# Patient Record
Sex: FEMALE | Race: WHITE | NOT HISPANIC OR LATINO | Employment: FULL TIME | URBAN - METROPOLITAN AREA
[De-identification: names, ages, dates, MRNs, and addresses within clinical notes are randomized per-mention and may not be internally consistent; named-entity substitution may affect disease eponyms.]

---

## 2018-01-24 ENCOUNTER — APPOINTMENT (OUTPATIENT)
Dept: CT IMAGING | Facility: HOSPITAL | Age: 58
End: 2018-01-24

## 2018-01-24 ENCOUNTER — APPOINTMENT (OUTPATIENT)
Dept: ULTRASOUND IMAGING | Facility: HOSPITAL | Age: 58
End: 2018-01-24

## 2018-01-24 ENCOUNTER — HOSPITAL ENCOUNTER (OUTPATIENT)
Facility: HOSPITAL | Age: 58
Setting detail: OBSERVATION
Discharge: HOME | End: 2018-01-25
Attending: EMERGENCY MEDICINE | Admitting: INTERNAL MEDICINE

## 2018-01-24 ENCOUNTER — APPOINTMENT (OUTPATIENT)
Dept: GENERAL RADIOLOGY | Facility: HOSPITAL | Age: 58
End: 2018-01-24

## 2018-01-24 DIAGNOSIS — R07.9 CHEST PAIN, UNSPECIFIED TYPE: Primary | ICD-10-CM

## 2018-01-24 DIAGNOSIS — R55 SYNCOPE, UNSPECIFIED SYNCOPE TYPE: ICD-10-CM

## 2018-01-24 LAB
ALBUMIN SERPL-MCNC: 4.3 G/DL (ref 3.2–4.8)
ALBUMIN/GLOB SERPL: 1.7 G/DL (ref 1.5–2.5)
ALP SERPL-CCNC: 53 U/L (ref 25–100)
ALT SERPL W P-5'-P-CCNC: 46 U/L (ref 7–40)
ANION GAP SERPL CALCULATED.3IONS-SCNC: 7 MMOL/L (ref 3–11)
AST SERPL-CCNC: 44 U/L (ref 0–33)
BACTERIA UR QL AUTO: ABNORMAL /HPF
BASOPHILS # BLD AUTO: 0.02 10*3/MM3 (ref 0–0.2)
BASOPHILS NFR BLD AUTO: 0.3 % (ref 0–1)
BILIRUB SERPL-MCNC: 0.5 MG/DL (ref 0.3–1.2)
BILIRUB UR QL STRIP: NEGATIVE
BUN BLD-MCNC: 12 MG/DL (ref 9–23)
BUN/CREAT SERPL: 15 (ref 7–25)
CALCIUM SPEC-SCNC: 9.4 MG/DL (ref 8.7–10.4)
CHLORIDE SERPL-SCNC: 106 MMOL/L (ref 99–109)
CLARITY UR: CLEAR
CO2 SERPL-SCNC: 23 MMOL/L (ref 20–31)
COLOR UR: YELLOW
CREAT BLD-MCNC: 0.8 MG/DL (ref 0.6–1.3)
D-LACTATE SERPL-SCNC: 1.7 MMOL/L (ref 0.5–2)
DEPRECATED RDW RBC AUTO: 56.1 FL (ref 37–54)
EOSINOPHIL # BLD AUTO: 0.13 10*3/MM3 (ref 0–0.3)
EOSINOPHIL NFR BLD AUTO: 1.9 % (ref 0–3)
ERYTHROCYTE [DISTWIDTH] IN BLOOD BY AUTOMATED COUNT: 14.8 % (ref 11.3–14.5)
GFR SERPL CREATININE-BSD FRML MDRD: 74 ML/MIN/1.73
GLOBULIN UR ELPH-MCNC: 2.6 GM/DL
GLUCOSE BLD-MCNC: 176 MG/DL (ref 70–100)
GLUCOSE UR STRIP-MCNC: NEGATIVE MG/DL
HCT VFR BLD AUTO: 41.7 % (ref 34.5–44)
HGB BLD-MCNC: 14.2 G/DL (ref 11.5–15.5)
HGB UR QL STRIP.AUTO: NEGATIVE
HOLD SPECIMEN: NORMAL
HOLD SPECIMEN: NORMAL
HYALINE CASTS UR QL AUTO: ABNORMAL /LPF
IMM GRANULOCYTES # BLD: 0.02 10*3/MM3 (ref 0–0.03)
IMM GRANULOCYTES NFR BLD: 0.3 % (ref 0–0.6)
INR PPP: 0.97
KETONES UR QL STRIP: ABNORMAL
LEUKOCYTE ESTERASE UR QL STRIP.AUTO: ABNORMAL
LIPASE SERPL-CCNC: 50 U/L (ref 6–51)
LYMPHOCYTES # BLD AUTO: 2.87 10*3/MM3 (ref 0.6–4.8)
LYMPHOCYTES NFR BLD AUTO: 42.3 % (ref 24–44)
MCH RBC QN AUTO: 35.3 PG (ref 27–31)
MCHC RBC AUTO-ENTMCNC: 34.1 G/DL (ref 32–36)
MCV RBC AUTO: 103.7 FL (ref 80–99)
MONOCYTES # BLD AUTO: 0.42 10*3/MM3 (ref 0–1)
MONOCYTES NFR BLD AUTO: 6.2 % (ref 0–12)
NEUTROPHILS # BLD AUTO: 3.32 10*3/MM3 (ref 1.5–8.3)
NEUTROPHILS NFR BLD AUTO: 49 % (ref 41–71)
NITRITE UR QL STRIP: NEGATIVE
PH UR STRIP.AUTO: 7.5 [PH] (ref 5–8)
PLATELET # BLD AUTO: 203 10*3/MM3 (ref 150–450)
PMV BLD AUTO: 10.1 FL (ref 6–12)
POTASSIUM BLD-SCNC: 4.3 MMOL/L (ref 3.5–5.5)
PROT SERPL-MCNC: 6.9 G/DL (ref 5.7–8.2)
PROT UR QL STRIP: NEGATIVE
PROTHROMBIN TIME: 10.6 SECONDS (ref 9.6–11.5)
RBC # BLD AUTO: 4.02 10*6/MM3 (ref 3.89–5.14)
RBC # UR: ABNORMAL /HPF
REF LAB TEST METHOD: ABNORMAL
SODIUM BLD-SCNC: 136 MMOL/L (ref 132–146)
SP GR UR STRIP: 1.01 (ref 1–1.03)
SQUAMOUS #/AREA URNS HPF: ABNORMAL /HPF
TROPONIN I SERPL-MCNC: 0 NG/ML (ref 0–0.07)
TROPONIN I SERPL-MCNC: <0.006 NG/ML
UROBILINOGEN UR QL STRIP: ABNORMAL
WBC NRBC COR # BLD: 6.78 10*3/MM3 (ref 3.5–10.8)
WBC UR QL AUTO: ABNORMAL /HPF
WHOLE BLOOD HOLD SPECIMEN: NORMAL
WHOLE BLOOD HOLD SPECIMEN: NORMAL

## 2018-01-24 PROCEDURE — 96372 THER/PROPH/DIAG INJ SC/IM: CPT

## 2018-01-24 PROCEDURE — 25010000002 ENOXAPARIN PER 10 MG: Performed by: INTERNAL MEDICINE

## 2018-01-24 PROCEDURE — 83605 ASSAY OF LACTIC ACID: CPT | Performed by: EMERGENCY MEDICINE

## 2018-01-24 PROCEDURE — G0378 HOSPITAL OBSERVATION PER HR: HCPCS

## 2018-01-24 PROCEDURE — 84484 ASSAY OF TROPONIN QUANT: CPT

## 2018-01-24 PROCEDURE — 0 IOPAMIDOL 61 % SOLUTION: Performed by: EMERGENCY MEDICINE

## 2018-01-24 PROCEDURE — 85025 COMPLETE CBC W/AUTO DIFF WBC: CPT | Performed by: EMERGENCY MEDICINE

## 2018-01-24 PROCEDURE — 96360 HYDRATION IV INFUSION INIT: CPT

## 2018-01-24 PROCEDURE — 96361 HYDRATE IV INFUSION ADD-ON: CPT

## 2018-01-24 PROCEDURE — 84484 ASSAY OF TROPONIN QUANT: CPT | Performed by: EMERGENCY MEDICINE

## 2018-01-24 PROCEDURE — 74178 CT ABD&PLV WO CNTR FLWD CNTR: CPT

## 2018-01-24 PROCEDURE — 99244 OFF/OP CNSLTJ NEW/EST MOD 40: CPT | Performed by: INTERNAL MEDICINE

## 2018-01-24 PROCEDURE — 83690 ASSAY OF LIPASE: CPT | Performed by: EMERGENCY MEDICINE

## 2018-01-24 PROCEDURE — 87086 URINE CULTURE/COLONY COUNT: CPT | Performed by: EMERGENCY MEDICINE

## 2018-01-24 PROCEDURE — 99285 EMERGENCY DEPT VISIT HI MDM: CPT

## 2018-01-24 PROCEDURE — 71045 X-RAY EXAM CHEST 1 VIEW: CPT

## 2018-01-24 PROCEDURE — 80053 COMPREHEN METABOLIC PANEL: CPT | Performed by: EMERGENCY MEDICINE

## 2018-01-24 PROCEDURE — 0 DIATRIZOATE MEGLUMINE & SODIUM PER 1 ML: Performed by: EMERGENCY MEDICINE

## 2018-01-24 PROCEDURE — 85610 PROTHROMBIN TIME: CPT | Performed by: EMERGENCY MEDICINE

## 2018-01-24 PROCEDURE — 93005 ELECTROCARDIOGRAM TRACING: CPT | Performed by: EMERGENCY MEDICINE

## 2018-01-24 PROCEDURE — 81001 URINALYSIS AUTO W/SCOPE: CPT | Performed by: EMERGENCY MEDICINE

## 2018-01-24 PROCEDURE — 76705 ECHO EXAM OF ABDOMEN: CPT

## 2018-01-24 RX ORDER — SODIUM CHLORIDE 0.9 % (FLUSH) 0.9 %
10 SYRINGE (ML) INJECTION AS NEEDED
Status: DISCONTINUED | OUTPATIENT
Start: 2018-01-24 | End: 2018-01-25 | Stop reason: HOSPADM

## 2018-01-24 RX ORDER — SODIUM CHLORIDE 450 MG/100ML
100 INJECTION, SOLUTION INTRAVENOUS CONTINUOUS
Status: DISCONTINUED | OUTPATIENT
Start: 2018-01-24 | End: 2018-01-25 | Stop reason: HOSPADM

## 2018-01-24 RX ORDER — LEVOTHYROXINE SODIUM 112 UG/1
112 TABLET ORAL
Status: DISCONTINUED | OUTPATIENT
Start: 2018-01-25 | End: 2018-01-25 | Stop reason: HOSPADM

## 2018-01-24 RX ORDER — LEVOTHYROXINE SODIUM 112 UG/1
112 TABLET ORAL DAILY
COMMUNITY

## 2018-01-24 RX ORDER — MEPERIDINE HYDROCHLORIDE 25 MG/ML
25 INJECTION INTRAMUSCULAR; INTRAVENOUS; SUBCUTANEOUS EVERY 4 HOURS PRN
Status: DISCONTINUED | OUTPATIENT
Start: 2018-01-24 | End: 2018-01-25 | Stop reason: HOSPADM

## 2018-01-24 RX ORDER — LISINOPRIL 20 MG/1
20 TABLET ORAL DAILY
COMMUNITY
End: 2018-01-25 | Stop reason: HOSPADM

## 2018-01-24 RX ORDER — CHOLECALCIFEROL (VITAMIN D3) 125 MCG
2000 CAPSULE ORAL DAILY
COMMUNITY

## 2018-01-24 RX ORDER — LANSOPRAZOLE 30 MG/1
30 CAPSULE, DELAYED RELEASE ORAL DAILY
COMMUNITY

## 2018-01-24 RX ORDER — ONDANSETRON 2 MG/ML
4 INJECTION INTRAMUSCULAR; INTRAVENOUS EVERY 6 HOURS PRN
Status: DISCONTINUED | OUTPATIENT
Start: 2018-01-24 | End: 2018-01-25 | Stop reason: HOSPADM

## 2018-01-24 RX ORDER — SODIUM CHLORIDE 450 MG/100ML
75 INJECTION, SOLUTION INTRAVENOUS CONTINUOUS
Status: DISCONTINUED | OUTPATIENT
Start: 2018-01-24 | End: 2018-01-25 | Stop reason: HOSPADM

## 2018-01-24 RX ORDER — HYDROCHLOROTHIAZIDE 25 MG/1
25 TABLET ORAL DAILY
COMMUNITY
End: 2018-01-25 | Stop reason: HOSPADM

## 2018-01-24 RX ORDER — ASPIRIN 81 MG/1
324 TABLET, CHEWABLE ORAL ONCE
Status: COMPLETED | OUTPATIENT
Start: 2018-01-24 | End: 2018-01-24

## 2018-01-24 RX ADMIN — IOPAMIDOL 100 ML: 612 INJECTION, SOLUTION INTRAVENOUS at 12:44

## 2018-01-24 RX ADMIN — ASPIRIN 81 MG 324 MG: 81 TABLET ORAL at 08:08

## 2018-01-24 RX ADMIN — Medication 15 ML: at 11:15

## 2018-01-24 RX ADMIN — SODIUM CHLORIDE 75 ML/HR: 4.5 INJECTION, SOLUTION INTRAVENOUS at 20:48

## 2018-01-24 RX ADMIN — ENOXAPARIN SODIUM 40 MG: 40 INJECTION SUBCUTANEOUS at 20:48

## 2018-01-24 NOTE — CONSULTS
[facility]  Cardiovascular Consultation Note    Reason for consultation: #1 syncope #2 chest pain 3 dominant pain    History of Present Illness:  This is a 57-year-old white female with a history of hypertension, nondiabetic, nonsmoker, no history of hyperlipidemia presents today after an episode of syncope associated with chest pain shortness of breath and diaphoresis.  The patient recently went to the [facility] emergency room complaining of chest pain shortness of breath and the study she describes sounds like a CT angiogram which was negative.  Patient states for the last 2-4 weeks she's been having shortness of breath and chest discomfort.  She describes chest pain as tightness occurring at both rest and exertion and last just a few minutes.  The patient has never had syncope before but she does get lightheaded when she stands up.  The patient stated she felt normal when she went to bed last night felt normal this morning when she got to work started having tightness in her chest and significant abdominal cramping she also got clammy and his shortness of breath.  While in the bathroom she started getting lightheaded and when she went back towards her office she had an episode of syncope.  No loss of bladder or bowel function.  No documented seizure activity.  She has affected bed on and during these episodes rate went as low as in the 40s.  She has had some recent weight loss but she's been on diet and her bowel function is within normal    Cardiac risk factors: #1 hypertension #2 positive family history    Past medical and surgical history, social and family history reviewed in EMR.    REVIEW OF SYSTEMS:     Past Medical History:   Diagnosis Date   • Anemia    • Disease of thyroid gland    • Hypertension      Past Surgical History:   Procedure Laterality Date   •  SECTION     • TUBAL ABDOMINAL LIGATION       Social History     Social History   • Marital  status:      Spouse name: N/A   • Number of children: N/A   • Years of education: N/A     Occupational History   • Not on file.     Social History Main Topics   • Smoking status: Never Smoker   • Smokeless tobacco: Not on file   • Alcohol use No   • Drug use: No   • Sexual activity: Not on file     Other Topics Concern   • Not on file     Social History Narrative   • No narrative on file     History reviewed. No pertinent family history.    H&P ROS reviewed and pertinent CV ROS as noted in HPI.    Cardiac:  Patient's been having tightness in her chest now for the last 3-4 weeks it occurs randomly also with exertion last short period of time.  No palpitations, she did have syncope today and no other episodes of syncope but does get lightheaded upon standing  Respiratory:  New onset dyspnea with no wheezing no hemoptysis no prolonged travel and a negative CTA  Lower Extremities:  No claudication no edema/she takes her fluid pill and her antihypertensive  GI:  No bright blood per rectum no melena but she did have intense abdominal cramping today  Neuro:  No history of stroke TIA or neurologic event no radicular pain  Hematology:  No bleeding diathesis ecchymosis or petechiae no clotting disorder  The remainder of the review of systems been obtained and is otherwise negative      Physical Exam: General  pleasant overweight white female lying in bed not on nasal O2 normal blood pressure and heart rate and in no acute distress       HEENT: No JVP no bruits tongue is midline no Hertoghs sign of the eyebrows sclera have no icterus       Respiratory:  Equal bilateral symmetrical expansion clear to auscultation bilaterally       Cardiovascular: Regular rate and rhythm without murmur gallop or click and no peripheral edema and 2+ left DP pulse and 1+ to 2+ right DP pulse on the right       GI: Sparse bowel sounds are noted in the lower quadrants positive bowel sounds in the mid quadrant she is tender to palpation in the  right lower quadrant with some mild rebound       Lower Extremities: No peripheral lesions normal strength       Neuro: Cranial nerves II through XII are grossly normal she moves all 4 extremities straight leg raise strength is equal bilaterally 5 over 5       Skin:  Warm and dry with no edema to palpation       Psych: Pleasant affect and oriented ×3    Results Review: I personally looked his EKG which is normal with no ischemia her troponin is 0.00 lipase is normal.  I reviewed her abdominal ultrasound and to my visualization I don't see any obvious gallstones.  I looked at her chest x-ray with no significant process going on she has no cardiomegaly           Vital Sign Min/Max for last 24 hours  Temp  Min: 97.8 °F (36.6 °C)  Max: 97.8 °F (36.6 °C)   BP  Min: 94/70  Max: 113/68   Pulse  Min: 75  Max: 96   Resp  Min: 20  Max: 20   SpO2  Min: 99 %  Max: 100 %   Flow (L/min)  Min: 2  Max: 2    No intake or output data in the 24 hours ending 01/24/18 1022          Current Facility-Administered Medications:   •  sodium chloride 0.9 % flush 10 mL, 10 mL, Intravenous, PRN, Dominguez Pfeiffer, DO    Current Outpatient Prescriptions:   •  Levothyroxine Sodium (SYNTHROID PO), Take 112 mcg by mouth Daily., Disp: , Rfl:   •  LISINOPRIL PO, Take 10 mg by mouth Daily., Disp: , Rfl:   •  VITAMIN B1-B12 IM, Inject  into the shoulder, thigh, or buttocks Every 30 (Thirty) Days., Disp: , Rfl:     Lab Review:     Results from last 7 days  Lab Units 01/24/18  0726   WBC 10*3/mm3 6.78   HEMOGLOBIN g/dL 14.2   PLATELETS 10*3/mm3 203       Results from last 7 days  Lab Units 01/24/18  0726   SODIUM mmol/L 136   POTASSIUM mmol/L 4.3   CO2 mmol/L 23.0   BUN mg/dL 12   CREATININE mg/dL 0.80   GLUCOSE mg/dL 176*     Estimated Creatinine Clearance: 72.1 mL/min (by C-G formula based on Cr of 0.8).        .lipd  Lab Results   Component Value Date    AST 44 (H) 01/24/2018    ALT 46 (H) 01/24/2018       Radiology Reports:  Imaging Results (last 72 hours)      Procedure Component Value Units Date/Time    XR Chest 1 View [[id number]] Collected:  01/24/18 0941     Updated:  01/24/18 0941    Narrative:       EXAMINATION: XR CHEST 1 VW- 01/24/2018     INDICATION: Chest pain protocol      COMPARISON: NONE     FINDINGS:   1. Heart size is borderline. The heart is compensated.  2. There is volume reduction at both lung bases with crowding of normal  lung markings. There is mild linear and nodular densities at the bases  worse on the right than left, likely atelectatic. There is no evidence  of free fluid or dominant mass. Upper lung zones are clear.       Impression:       1. Volume reduction with crowding of normal lung markings at  the bases. Borderline heart size.  2. No active disease otherwise.     D:  01/24/2018  E:  01/24/2018          US Gallbladder [[id number]] Updated:  01/24/18 1008          Assessment/Plan: 1#1 syncopeAssociated with intense abdominal pain shortness of breath chest pain and diaphoresis.  Current EKG is normal and troponin ×1 was 0.00.  She does have some CAD risk factors but her intense abdominal pain associated with some mild right lower quadrant pain and minimal rebound makes me think that she had vasovagal syncope.  I recommend CT scan of the abdomen and pelvis with and without contrast to look for any intra-abdominal pathology and if that's negative we'll admit the patient for observation and perform a nuclear stress test  2 chest pain -she has a normal EKG and a normal troponin ×1.  She does have a couple of CAD risk factors but her chest pain syndrome is atypical  3 mild orthostatic hypotension-I will give her IV fluids      Thiago Rivero MD  01/24/18  10:22 AM

## 2018-01-24 NOTE — ED PROVIDER NOTES
Subjective   HPI Comments: Patrizia Koo is a 57 y.o.female who presents to the ED with c/o syncope, chest pain, and abdominal pain. She reports she was at work this morning at 0645 when she suddenly developed generalized weakness, lightheadedness, nausea, diaphoresis, SOA, and diffuse body tingling. Soon thereafter, she developed sharp substernal chest pain, generalized abdominal pain, and a mild headache. She went to the bathroom with little relief and her symptoms appeared to worsen. She then proceeded to pass out. The episode was witnessed by coworkers. The patient describes a 10 minute lapse in time where she is unable to recall what happened. Following that event, she sat down in her office and drank some juice with minimal relief. Upon EMS arrival, she was breathing fast and appeared slightly panicked. EMS started her on supplemental oxygen via nasal cannula and she was transported to the ED. She states her pain has been intermittent since, lasting a few minutes at a time. She rates the pain an 8/10 at its worst. Her pain remains unchanged when taking deep breaths. She also c/o chronic back pain but denies recent trauma or any other complaints at this time. She has not had a stress test or cardiac catheterization done in the past. She has a history of hypertension but no history of high cholesterol, diabetes mellitus, or smoking. Her mother had cardiac stents placed in her 60's.       Patient is a 57 y.o. female presenting with chest pain.   History provided by:  Patient  Chest Pain   Pain location:  Substernal area  Pain quality: sharp    Pain radiates to:  Does not radiate  Pain severity:  Moderate  Onset quality:  Sudden  Timing:  Intermittent  Progression:  Unchanged  Chronicity:  New  Relieved by:  Nothing  Ineffective treatments:  Oxygen (Sitting down, drinking juice)  Associated symptoms: abdominal pain, back pain (chronic ), diaphoresis, headache, nausea, numbness (diffuse body tingling ), shortness of  breath, syncope and weakness (generalized )    Risk factors: hypertension    Risk factors: no coronary artery disease, no diabetes mellitus, no high cholesterol and no smoking        Review of Systems   Constitutional: Positive for diaphoresis.   Respiratory: Positive for shortness of breath.    Cardiovascular: Positive for chest pain and syncope.   Gastrointestinal: Positive for abdominal pain and nausea.   Musculoskeletal: Positive for back pain (chronic ).   Neurological: Positive for syncope, weakness (generalized ), light-headedness, numbness (diffuse body tingling ) and headaches.   Psychiatric/Behavioral:        Appeared slightly panicked.    All other systems reviewed and are negative.      Past Medical History:   Diagnosis Date   • Anemia    • Disease of thyroid gland    • Hypertension        Allergies   Allergen Reactions   • Codeine Itching   • Penicillins Swelling       Past Surgical History:   Procedure Laterality Date   •  SECTION     • TUBAL ABDOMINAL LIGATION         History reviewed. No pertinent family history.    Social History     Social History   • Marital status:      Spouse name: N/A   • Number of children: N/A   • Years of education: N/A     Social History Main Topics   • Smoking status: Never Smoker   • Smokeless tobacco: None   • Alcohol use No   • Drug use: No   • Sexual activity: Not Asked     Other Topics Concern   • None     Social History Narrative   • None         Objective   Physical Exam   Constitutional: She is oriented to person, place, and time. She appears well-developed and well-nourished. No distress.   HENT:   Head: Normocephalic and atraumatic.   Nose: Nose normal.   Eyes: Conjunctivae are normal. No scleral icterus.   Neck: Normal range of motion. Neck supple.   Cardiovascular: Normal rate, regular rhythm and normal heart sounds.    No murmur heard.  Pulmonary/Chest: Effort normal and breath sounds normal. No respiratory distress. She exhibits tenderness  (Reproducible anterior chest wall tenderness to palpation ).   Abdominal: Soft. Bowel sounds are normal. There is tenderness (Generalized abdominal tenderness ).   Musculoskeletal: Normal range of motion. She exhibits tenderness. She exhibits no edema.   Lower thoracic tenderness localized in the midline which is chronic according to the patient.     Neurological: She is alert and oriented to person, place, and time.   Skin: Skin is warm and dry.   Psychiatric: She has a normal mood and affect. Her behavior is normal.   Nursing note and vitals reviewed.      Procedures         ED Course  ED Course     Recent Results (from the past 24 hour(s))   Light Blue Top    Collection Time: 01/24/18  7:26 AM   Result Value Ref Range    Extra Tube hold for add-on    Green Top (Gel)    Collection Time: 01/24/18  7:26 AM   Result Value Ref Range    Extra Tube Hold for add-ons.    Lavender Top    Collection Time: 01/24/18  7:26 AM   Result Value Ref Range    Extra Tube hold for add-on    Gold Top - SST    Collection Time: 01/24/18  7:26 AM   Result Value Ref Range    Extra Tube Hold for add-ons.    Comprehensive Metabolic Panel    Collection Time: 01/24/18  7:26 AM   Result Value Ref Range    Glucose 176 (H) 70 - 100 mg/dL    BUN 12 9 - 23 mg/dL    Creatinine 0.80 0.60 - 1.30 mg/dL    Sodium 136 132 - 146 mmol/L    Potassium 4.3 3.5 - 5.5 mmol/L    Chloride 106 99 - 109 mmol/L    CO2 23.0 20.0 - 31.0 mmol/L    Calcium 9.4 8.7 - 10.4 mg/dL    Total Protein 6.9 5.7 - 8.2 g/dL    Albumin 4.30 3.20 - 4.80 g/dL    ALT (SGPT) 46 (H) 7 - 40 U/L    AST (SGOT) 44 (H) 0 - 33 U/L    Alkaline Phosphatase 53 25 - 100 U/L    Total Bilirubin 0.5 0.3 - 1.2 mg/dL    eGFR Non African Amer 74 >60 mL/min/1.73    Globulin 2.6 gm/dL    A/G Ratio 1.7 1.5 - 2.5 g/dL    BUN/Creatinine Ratio 15.0 7.0 - 25.0    Anion Gap 7.0 3.0 - 11.0 mmol/L   Protime-INR    Collection Time: 01/24/18  7:26 AM   Result Value Ref Range    Protime 10.6 9.6 - 11.5 Seconds     INR 0.97    Lipase    Collection Time: 01/24/18  7:26 AM   Result Value Ref Range    Lipase 50 6 - 51 U/L   CBC Auto Differential    Collection Time: 01/24/18  7:26 AM   Result Value Ref Range    WBC 6.78 3.50 - 10.80 10*3/mm3    RBC 4.02 3.89 - 5.14 10*6/mm3    Hemoglobin 14.2 11.5 - 15.5 g/dL    Hematocrit 41.7 34.5 - 44.0 %    .7 (H) 80.0 - 99.0 fL    MCH 35.3 (H) 27.0 - 31.0 pg    MCHC 34.1 32.0 - 36.0 g/dL    RDW 14.8 (H) 11.3 - 14.5 %    RDW-SD 56.1 (H) 37.0 - 54.0 fl    MPV 10.1 6.0 - 12.0 fL    Platelets 203 150 - 450 10*3/mm3    Neutrophil % 49.0 41.0 - 71.0 %    Lymphocyte % 42.3 24.0 - 44.0 %    Monocyte % 6.2 0.0 - 12.0 %    Eosinophil % 1.9 0.0 - 3.0 %    Basophil % 0.3 0.0 - 1.0 %    Immature Grans % 0.3 0.0 - 0.6 %    Neutrophils, Absolute 3.32 1.50 - 8.30 10*3/mm3    Lymphocytes, Absolute 2.87 0.60 - 4.80 10*3/mm3    Monocytes, Absolute 0.42 0.00 - 1.00 10*3/mm3    Eosinophils, Absolute 0.13 0.00 - 0.30 10*3/mm3    Basophils, Absolute 0.02 0.00 - 0.20 10*3/mm3    Immature Grans, Absolute 0.02 0.00 - 0.03 10*3/mm3   POC Troponin, Rapid    Collection Time: 01/24/18  8:02 AM   Result Value Ref Range    Troponin I 0.00 0.00 - 0.07 ng/mL   Urinalysis With / Culture If Indicated - Urine, Clean Catch    Collection Time: 01/24/18  8:08 AM   Result Value Ref Range    Color, UA Yellow Yellow, Straw    Appearance, UA Clear Clear    pH, UA 7.5 5.0 - 8.0    Specific Gravity, UA 1.012 1.001 - 1.030    Glucose, UA Negative Negative    Ketones, UA Trace (A) Negative    Bilirubin, UA Negative Negative    Blood, UA Negative Negative    Protein, UA Negative Negative    Leuk Esterase, UA Moderate (2+) (A) Negative    Nitrite, UA Negative Negative    Urobilinogen, UA 0.2 E.U./dL 0.2 - 1.0 E.U./dL   Urinalysis, Microscopic Only - Urine, Clean Catch    Collection Time: 01/24/18  8:08 AM   Result Value Ref Range    RBC, UA 0-2 None Seen, 0-2 /HPF    WBC, UA 3-5 (A) None Seen /HPF    Bacteria, UA None Seen None Seen,  Trace /HPF    Squamous Epithelial Cells, UA 3-6 (A) None Seen, 0-2 /HPF    Hyaline Casts, UA 0-6 0 - 6 /LPF    Methodology Manual Light Microscopy    Lactic Acid, Plasma    Collection Time: 01/24/18  8:34 AM   Result Value Ref Range    Lactate 1.7 0.5 - 2.0 mmol/L   Troponin    Collection Time: 01/24/18 10:47 AM   Result Value Ref Range    Troponin I <0.006 <=0.039 ng/mL     Note: In addition to lab results from this visit, the labs listed above may include labs taken at another facility or during a different encounter within the last 24 hours. Please correlate lab times with ED admission and discharge times for further clarification of the services performed during this visit.    US Gallbladder   Final Result   Negative upper abdominal, right upper quadrant ultrasound.   No acute focal abnormalities are noted as described.       D:  01/24/2018   E:  01/24/2018            This report was finalized on 1/24/2018 12:06 PM by Dr. Luis Alberto Nelson MD.          XR Chest 1 View   Final Result   1. Volume reduction with crowding of normal lung markings at   the bases. Borderline heart size.   2. No active disease otherwise.       D:  01/24/2018   E:  01/24/2018       This report was finalized on 1/24/2018 11:03 AM by Dr. Luis Alberto Nelson MD.          CT Abdomen Pelvis With & Without Contrast    (Results Pending)     Vitals:    01/24/18 0830 01/24/18 0929 01/24/18 1100 01/24/18 1215   BP:  110/68     Patient Position:       Pulse: 95 91 88 84   Resp:       Temp:       TempSrc:       SpO2: 99% 100% 100% 99%   Weight:       Height:         Medications   sodium chloride 0.9 % flush 10 mL (not administered)   sodium chloride 0.45 % infusion (not administered)   enoxaparin (LOVENOX) syringe 40 mg (not administered)   sodium chloride 0.45 % infusion (not administered)   ondansetron (ZOFRAN) injection 4 mg (not administered)   meperidine (DEMEROL) injection 25 mg (not administered)   levothyroxine (SYNTHROID, LEVOTHROID) tablet 112  mcg (not administered)   aspirin chewable tablet 324 mg (324 mg Oral Given 1/24/18 0808)   diatrizoate meglumine-sodium (GASTROGRAFIN) 66-10 % solution 15 mL (15 mL Oral Given 1/24/18 1115)   iopamidol (ISOVUE-300) 61 % injection 100 mL (100 mL Intravenous Given 1/24/18 1244)     ECG/EMG Results (last 24 hours)     Procedure Component Value Units Date/Time    ECG 12 Lead [[id number]] Collected:  01/24/18 0724     Updated:  01/24/18 0738    ECG 12 Lead [[id number]] Collected:  01/24/18 1052     Updated:  01/24/18 1102        Cardiology evaluated pt and admitted following negative CT scan of the abdomen and pelvis.                MDM    Final diagnoses:   Chest pain, unspecified type   Syncope, unspecified syncope type       Documentation assistance provided by shalini Evans.  Information recorded by the scribe was done at my direction and has been verified and validated by me.     Tavia Evans  01/24/18 0851       Tavia Evans  01/24/18 1448       Tavia Evans  01/24/18 1512       Dominguez Pfeiffer DO  01/24/18 1533

## 2018-01-24 NOTE — PROGRESS NOTES
Discharge Planning Assessment  [facility]     Patient Name: Patrizia Koo  MRN: [id number]  Today's Date: 1/24/2018    Admit Date: 1/24/2018          Discharge Needs Assessment       01/24/18 1735    Living Environment    Lives With spouse   Pt resides in [place] [facility] [facility] with her  Varun    Living Arrangements house    Type of Financial/Environmental Concern none    Transportation Available car;family or friend will provide    Living Environment    Provides Primary Care For no one    Quality Of Family Relationships supportive;helpful;involved    Able to Return to Prior Living Arrangements yes    Discharge Needs Assessment    Concerns To Be Addressed no discharge needs identified;denies needs/concerns at this time    Readmission Within The Last 30 Days no previous admission in last 30 days    Anticipated Changes Related to Illness none    Equipment Currently Used at Home none    Equipment Needed After Discharge none    Discharge Contact Information if Applicable [phone]            Discharge Plan       01/24/18 1736    Case Management/Social Work Plan    Plan home    Patient/Family In Agreement With Plan yes    Additional Comments CM spoke with pt and family at bedside. Pt plans to return home and denies dsicharge needs at this time. CM will continue to follow.         Discharge Placement     No information found                Demographic Summary       01/24/18 1730    Referral Information    Referral Source admission list    Contact Information    Permission Granted to Share Information With     Primary Care Physician Information    Name JERI Bee            Functional Status       01/24/18 1730    Functional Status Current    Current Functional Level Comment unable ot assess    Functional Status Prior    Ambulation 0-->independent    Transferring 0-->independent    Toileting 0-->independent    Bathing 0-->independent    Dressing 0-->independent    Eating 0-->independent     Communication 0-->understands/communicates without difficulty    Swallowing 0-->swallows foods/liquids without difficulty    IADL    Medications independent   pt confirm she has prescription drug coverage and denies issues obtaining or affording current medications    Meal Preparation independent    Housekeeping independent    Laundry independent    Shopping independent    Oral Care independent    Activity Tolerance    Current Activity Limitations none    Usual Activity Tolerance good    Current Activity Tolerance moderate    Employment/Financial    Financial Concerns none   Pt confirems she has [employer] and [employer]. She does not have [employer] card with her however  to obtain tonight and submit to registration tomorrow for accurate billing.            Psychosocial     None            Abuse/Neglect     None            Legal     None            Substance Abuse     None            Patient Forms     None          Cheyanne Matias

## 2018-01-25 ENCOUNTER — APPOINTMENT (OUTPATIENT)
Dept: CARDIOLOGY | Facility: HOSPITAL | Age: 58
End: 2018-01-25
Attending: INTERNAL MEDICINE

## 2018-01-25 VITALS
HEART RATE: 89 BPM | WEIGHT: 174.2 LBS | TEMPERATURE: 98 F | DIASTOLIC BLOOD PRESSURE: 78 MMHG | RESPIRATION RATE: 20 BRPM | OXYGEN SATURATION: 98 % | BODY MASS INDEX: 35.12 KG/M2 | SYSTOLIC BLOOD PRESSURE: 118 MMHG | HEIGHT: 59 IN

## 2018-01-25 LAB
ANION GAP SERPL CALCULATED.3IONS-SCNC: 6 MMOL/L (ref 3–11)
BH CV NUCLEAR PRIOR STUDY: 2
BH CV STRESS BP STAGE 1: NORMAL
BH CV STRESS BP STAGE 2: NORMAL
BH CV STRESS BP STAGE 4: NORMAL
BH CV STRESS COMMENTS STAGE 1: NORMAL
BH CV STRESS DOSE REGADENOSON STAGE 1: 0.4
BH CV STRESS DURATION MIN STAGE 1: 0
BH CV STRESS DURATION MIN STAGE 2: 1
BH CV STRESS DURATION MIN STAGE 3: 1
BH CV STRESS DURATION MIN STAGE 4: 1
BH CV STRESS DURATION SEC STAGE 1: 15
BH CV STRESS DURATION SEC STAGE 2: 0
BH CV STRESS HR STAGE 1: 106
BH CV STRESS HR STAGE 2: 110
BH CV STRESS HR STAGE 3: 109
BH CV STRESS HR STAGE 4: 102
BH CV STRESS O2 STAGE 1: 64
BH CV STRESS PROTOCOL 1: NORMAL
BH CV STRESS RECOVERY BP: NORMAL MMHG
BH CV STRESS RECOVERY HR: 97 BPM
BH CV STRESS STAGE 1: 1
BH CV STRESS STAGE 2: 2
BH CV STRESS STAGE 3: 3
BH CV STRESS STAGE 4: 4
BUN BLD-MCNC: 8 MG/DL (ref 9–23)
BUN/CREAT SERPL: 10 (ref 7–25)
CALCIUM SPEC-SCNC: 9.2 MG/DL (ref 8.7–10.4)
CHLORIDE SERPL-SCNC: 103 MMOL/L (ref 99–109)
CO2 SERPL-SCNC: 27 MMOL/L (ref 20–31)
CREAT BLD-MCNC: 0.8 MG/DL (ref 0.6–1.3)
GFR SERPL CREATININE-BSD FRML MDRD: 74 ML/MIN/1.73
GLUCOSE BLD-MCNC: 92 MG/DL (ref 70–100)
LV EF NUC BP: 84 %
MAXIMAL PREDICTED HEART RATE: 163 BPM
PERCENT MAX PREDICTED HR: 69.94 %
POTASSIUM BLD-SCNC: 4.2 MMOL/L (ref 3.5–5.5)
SODIUM BLD-SCNC: 136 MMOL/L (ref 132–146)
STRESS BASELINE BP: NORMAL MMHG
STRESS BASELINE HR: 88 BPM
STRESS PERCENT HR: 82 %
STRESS POST PEAK BP: NORMAL MMHG
STRESS POST PEAK HR: 114 BPM
STRESS TARGET HR: 139 BPM

## 2018-01-25 PROCEDURE — A9555 RB82 RUBIDIUM: HCPCS | Performed by: INTERNAL MEDICINE

## 2018-01-25 PROCEDURE — G0378 HOSPITAL OBSERVATION PER HR: HCPCS

## 2018-01-25 PROCEDURE — 93017 CV STRESS TEST TRACING ONLY: CPT

## 2018-01-25 PROCEDURE — 99217 PR OBSERVATION CARE DISCHARGE MANAGEMENT: CPT | Performed by: INTERNAL MEDICINE

## 2018-01-25 PROCEDURE — 78491 MYOCRD IMG PET 1STD RST/STRS: CPT | Performed by: INTERNAL MEDICINE

## 2018-01-25 PROCEDURE — 96361 HYDRATE IV INFUSION ADD-ON: CPT

## 2018-01-25 PROCEDURE — 93018 CV STRESS TEST I&R ONLY: CPT | Performed by: INTERNAL MEDICINE

## 2018-01-25 PROCEDURE — 0 RUBIDIUM CHLORIDE: Performed by: INTERNAL MEDICINE

## 2018-01-25 PROCEDURE — 25010000002 REGADENOSON 0.4 MG/5ML SOLUTION: Performed by: INTERNAL MEDICINE

## 2018-01-25 PROCEDURE — 78491 MYOCRD IMG PET 1STD RST/STRS: CPT

## 2018-01-25 PROCEDURE — 80048 BASIC METABOLIC PNL TOTAL CA: CPT | Performed by: INTERNAL MEDICINE

## 2018-01-25 RX ORDER — ACETAMINOPHEN 325 MG/1
650 TABLET ORAL EVERY 6 HOURS PRN
Status: DISCONTINUED | OUTPATIENT
Start: 2018-01-25 | End: 2018-01-25 | Stop reason: HOSPADM

## 2018-01-25 RX ADMIN — ACETAMINOPHEN 650 MG: 325 TABLET, FILM COATED ORAL at 08:57

## 2018-01-25 RX ADMIN — RUBIDIUM CHLORIDE RB-82 1 DOSE: 150 INJECTION, SOLUTION INTRAVENOUS at 09:25

## 2018-01-25 RX ADMIN — RUBIDIUM CHLORIDE RB-82 1 DOSE: 150 INJECTION, SOLUTION INTRAVENOUS at 09:35

## 2018-01-25 RX ADMIN — LEVOTHYROXINE SODIUM 112 MCG: 112 TABLET ORAL at 06:35

## 2018-01-25 RX ADMIN — REGADENOSON 0.4 MG: 0.08 INJECTION, SOLUTION INTRAVENOUS at 09:39

## 2018-01-25 NOTE — PROGRESS NOTES
[facility] at [facility]  IP Progress Note      Chief Complaint/Reason for service:  Syncope #2 chest pain shortness of breath    Subjective   Subjective: The patient complaining of a headache this morning    Past medical, surgical, social and family history reviewed in the patient's electronic medical record.    Objective     Vital Sign Min/Max for last 24 hours  Temp  Min: 98.2 °F (36.8 °C)  Max: 98.6 °F (37 °C)   BP  Min: 98/68  Max: 115/81   Pulse  Min: 84  Max: 116   Resp  Min: 20  Max: 20   SpO2  Min: 93 %  Max: 100 %   Flow (L/min)  Min: 2  Max: 2      Intake/Output Summary (Last 24 hours) at 01/25/18 0811  Last data filed at 01/25/18 0433   Gross per 24 hour   Intake              240 ml   Output              400 ml   Net             -160 ml             Current Facility-Administered Medications:   •  enoxaparin (LOVENOX) syringe 40 mg, 40 mg, Subcutaneous, Nightly, Thiago Rivero MD, 40 mg at 01/24/18 2048  •  levothyroxine (SYNTHROID, LEVOTHROID) tablet 112 mcg, 112 mcg, Oral, Q AM, Thiago Rivero MD, 112 mcg at 01/25/18 0635  •  meperidine (DEMEROL) injection 25 mg, 25 mg, Intravenous, Q4H PRN, Thiago Rivero MD  •  ondansetron (ZOFRAN) injection 4 mg, 4 mg, Intravenous, Q6H PRN, Thiago Rivero MD  •  sodium chloride 0.45 % infusion, 100 mL/hr, Intravenous, Continuous, Thiago Rivero MD  •  sodium chloride 0.45 % infusion, 75 mL/hr, Intravenous, Continuous, Thiago Rivero MD, Last Rate: 75 mL/hr at 01/25/18 0727, 75 mL/hr at 01/25/18 0727  •  sodium chloride 0.9 % flush 10 mL, 10 mL, Intravenous, PRN, Dominguez Pfeiffer DO    Physical Exam: General  Well-developed overweight white female in no acute distress        HEENT:        Respiratory:         Cardiovascular:        GI:        Lower Extremities:        Neuro:        Skin:         Psych:     Results Review: 2 troponins are normal EKG is normal.  CT of the abdomen and pelvis yesterday showed no acute  process    Radiology Results:  Imaging Results (last 72 hours)     Procedure Component Value Units Date/Time    XR Chest 1 View [[id number]] Collected:  01/24/18 0941     Updated:  01/24/18 1105    Narrative:       EXAMINATION: XR CHEST 1 VW- 01/24/2018     INDICATION: Chest pain protocol      COMPARISON: NONE     FINDINGS:   1. Heart size is borderline. The heart is compensated.  2. There is volume reduction at both lung bases with crowding of normal  lung markings. There is mild linear and nodular densities at the bases  worse on the right than left, likely atelectatic. There is no evidence  of free fluid or dominant mass. Upper lung zones are clear.       Impression:       1. Volume reduction with crowding of normal lung markings at  the bases. Borderline heart size.  2. No active disease otherwise.     D:  01/24/2018  E:  01/24/2018     This report was finalized on 1/24/2018 11:03 AM by Dr. Luis Alberto Nelson MD.       US Gallbladder [[id number]] Collected:  01/24/18 1204     Updated:  01/24/18 1208    Narrative:       EXAMINATION: US GALLBLADDER- 01/24/2018     INDICATION: Upper abdominal pain         COMPARISON: NONE     FINDINGS:   1. Pancreatic images are normal. There is no evidence of pancreatic mass  or cyst.  2. Echo architecture of the liver is normal. Focal liver mass is not  identified. There is no free fluid around the liver.  3. Gallbladder examination is negative. Mobile shadowing stones are not  identified. Gallbladder wall thickness is normal. Common bile duct is  normal and measures 1.8 mm.  4. The right kidney measures 11.3 x 3.9 x 4.8 cm. There is 1.1 cm of  cortical thickness. There is a mild extrarenal pelvis. There is no right  renal mass or high-grade obstruction.       Impression:       Negative upper abdominal, right upper quadrant ultrasound.  No acute focal abnormalities are noted as described.     D:  01/24/2018  E:  01/24/2018         This report was finalized on 1/24/2018 12:06 PM by   Luis Alberto Nelson MD.       CT Abdomen Pelvis With & Without Contrast [[id number]] Collected:  01/24/18 1541     Updated:  01/24/18 1602    Narrative:       EXAMINATION: CT ABDOMEN PELVIS W/WO CONTRAST - 01/24/2018     INDICATION: Abdominal pain, right upper quadrant pain and nausea.     TECHNIQUE: CT datasets of the abdomen and pelvis were performed without  and with intravenous and oral contrast.     The radiation dose reduction device was turned on for each scan per the  ALARA (As Low as Reasonably Achievable) protocol.     COMPARISON: No pertinent comparison datasets are available.     FINDINGS:   1. Gallbladder is negative for abnormal radiodensities. Renal or  ureteral stones or not identified and there are no bladder stones on the  precontrast evaluation. Also, no definite signs of choledocholithiasis  are noted.     2. The multiphase postcontrast datasets demonstrate a normal-appearing  liver and spleen with scattered calcified granulomas but no evidence of  solid mass. There is trace hiatus hernia in the lower chest. The kidneys  are negative for stone, mass or obstruction. Retrocrural space is clear.  The adrenals are normal. Pancreas is negative for mass, cyst or  stranding. Periaortic space and retroperitoneum are clear. There is no  inflammatory bowel disease and there is no right upper quadrant acute  abnormality.     3. There is no evidence of stranding or edema in the mesentery or  omentum. The lower abdomen and pelvis are clear. Uterus and adnexal  structures are normal. The lower lung zones are nonrevealing.       Impression:       Negative CT data set of the abdomen and pelvis for acute  focal abnormality.     DICTATED:     01/24/2018  EDITED    :     01/24/2018         This report was finalized on 1/24/2018 4:00 PM by Dr. Luis Alberto Nelson MD.             EKG: Sinus rhythm no ischemia    ECHO:     Tele:  Occasional episode of sinus tachycardia but there is no bradycardia no pauses and no  SVT    Assessment   Assessment/Plan: 1 this patient presented with an episode of syncope which was preceded with severe abdominal cramping so it suggests vasovagal syncope.  She also little orthostatic yesterday in the ER and she's been hydrated aggressively.  Her blood pressure was a little orthostatic yesterday and she was hydrated aggressively  2 chest pain and shortness of breath-patient is scheduled for a stress pet today  3 history of hypertension but the patient's blood pressure is been in the 90s to low 100s.  I did not resume her lisinopril yesterday.  Upon discharge she may leave without it and make her declare a blood pressure reading high enough that she needs to be on antihypertensive drugs    Thiago Rivero MD  01/25/18  8:11 AM

## 2018-01-25 NOTE — PLAN OF CARE
Problem: Patient Care Overview (Adult)  Goal: Plan of Care Review  Outcome: Ongoing (interventions implemented as appropriate)   01/25/18 0516   Coping/Psychosocial Response Interventions   Plan Of Care Reviewed With patient   Patient Care Overview   Progress no change   Outcome Evaluation   Outcome Summary/Follow up Plan VSS, has had no c/o chest or abdominal pain since arriving to the floor. Has been NPO since 0000 for stress test today,. Will continue to monitor.       Problem: Pain, Acute (Adult)  Goal: Identify Related Risk Factors and Signs and Symptoms  Outcome: Ongoing (interventions implemented as appropriate)    Goal: Acceptable Pain Control/Comfort Level  Outcome: Ongoing (interventions implemented as appropriate)

## 2018-01-25 NOTE — DISCHARGE SUMMARY
Cardiology Discharge Note    Patient Name:  Patrizia Koo  Date of Admission:  1/24/2018  Date of Discharge:  1/25/2018    Discharge Diagnosis: syncope and atypical chest pain    Problem List:  Active Problems:    Chest pain      Presenting Problem/History of Present Illness:  Chest pain [R07.9]    58 yo wf who started have severe abdominal pain and became soa and sweaty. Also had chest pain. All 3 troponin negative and ekg normal. Had normal stress pet but calcification of lad noted. Her CT and GB u/s were negative.    Hospital Course:  Patient is a 57 y.o. female presented with see above.        Physical Exam:  Temp:  [98.2 °F (36.8 °C)-98.6 °F (37 °C)] 98.2 °F (36.8 °C)  Heart Rate:  [] 85  Resp:  [20] 20  BP: ()/(68-81) 98/68    Neck:  No jvp/no bruit    Cardiovascular: RRR without m/g/c        Respiratory:  clear    Neurologic:  CN 2-12 wnl      Procedures Performed: CT scan  2 GB u/s  3 stress pet      Consults:   Consults     Date and Time Order Name Status Description    1/24/2018 0943 Cardiology (on-call MD unless specified) Completed             Condition on Discharge:  godd      Discharge Disposition: home    Discharge Medications:   Patrizia Koo   Home Medication Instructions RACHELE:[id number]    Printed on:01/25/18 1138   Medication Information                      aspirin 81 MG tablet  Take 1 tablet by mouth Daily.             Cholecalciferol (VITAMIN D3) 2000 units tablet  Take 2,000 Units by mouth Daily.             lansoprazole (PREVACID) 30 MG capsule  Take 30 mg by mouth Daily.             levothyroxine (SYNTHROID, LEVOTHROID) 112 MCG tablet  Take 112 mcg by mouth Daily.             naltrexone-bupropion ER (CONTRAVE) 8-90 MG tablet  Take 2 tablets by mouth 2 (Two) Times a Day.                 Discharge Diet: healthy heart    Activity at Discharge: limit activity for 1 week. Avoid strenuous activity in the cold.Return to ER for recurrent chest pain  /soa/syncope    Follow-up  Appointments: [facility] 4 weeks      Time:20 minutes    Thiago Rivero MD,  1/25/2018 - 11:38 AM

## 2018-01-26 LAB
BACTERIA SPEC AEROBE CULT: NORMAL
BACTERIA SPEC AEROBE CULT: NORMAL

## 2024-08-06 ENCOUNTER — HOSPITAL ENCOUNTER (EMERGENCY)
Facility: HOSPITAL | Age: 64
Discharge: HOME | End: 2024-08-06
Attending: EMERGENCY MEDICINE | Admitting: EMERGENCY MEDICINE
Payer: COMMERCIAL

## 2024-08-06 VITALS
WEIGHT: 155 LBS | RESPIRATION RATE: 20 BRPM | SYSTOLIC BLOOD PRESSURE: 176 MMHG | BODY MASS INDEX: 30.43 KG/M2 | OXYGEN SATURATION: 95 % | HEART RATE: 95 BPM | TEMPERATURE: 98.1 F | DIASTOLIC BLOOD PRESSURE: 95 MMHG | HEIGHT: 60 IN

## 2024-08-06 DIAGNOSIS — S51.812A LACERATION OF LEFT FOREARM, INITIAL ENCOUNTER: Primary | ICD-10-CM

## 2024-08-06 DIAGNOSIS — I10 ELEVATED BLOOD PRESSURE READING WITH DIAGNOSIS OF HYPERTENSION: ICD-10-CM

## 2024-08-06 DIAGNOSIS — S50.12XA HEMATOMA OF LEFT FOREARM: ICD-10-CM

## 2024-08-06 DIAGNOSIS — F41.9 ANXIETY: ICD-10-CM

## 2024-08-06 DIAGNOSIS — R20.0 NUMBNESS AND TINGLING IN LEFT HAND: ICD-10-CM

## 2024-08-06 DIAGNOSIS — R20.2 NUMBNESS AND TINGLING IN LEFT HAND: ICD-10-CM

## 2024-08-06 DIAGNOSIS — Z86.39 HISTORY OF HYPOTHYROIDISM: ICD-10-CM

## 2024-08-06 DIAGNOSIS — R25.1 TREMULOUSNESS: ICD-10-CM

## 2024-08-06 LAB
ALBUMIN SERPL-MCNC: 3.9 G/DL (ref 3.5–5.2)
ALBUMIN/GLOB SERPL: 1.5 G/DL
ALP SERPL-CCNC: 53 U/L (ref 39–117)
ALT SERPL W P-5'-P-CCNC: 20 U/L (ref 1–33)
ANION GAP SERPL CALCULATED.3IONS-SCNC: 16 MMOL/L (ref 5–15)
AST SERPL-CCNC: 36 U/L (ref 1–32)
BASOPHILS # BLD AUTO: 0.02 10*3/MM3 (ref 0–0.2)
BASOPHILS NFR BLD AUTO: 0.3 % (ref 0–1.5)
BILIRUB SERPL-MCNC: 0.5 MG/DL (ref 0–1.2)
BUN SERPL-MCNC: 8 MG/DL (ref 8–23)
BUN/CREAT SERPL: 10 (ref 7–25)
CALCIUM SPEC-SCNC: 8.6 MG/DL (ref 8.6–10.5)
CHLORIDE SERPL-SCNC: 104 MMOL/L (ref 98–107)
CO2 SERPL-SCNC: 20 MMOL/L (ref 22–29)
CREAT SERPL-MCNC: 0.8 MG/DL (ref 0.57–1)
DEPRECATED RDW RBC AUTO: 45.1 FL (ref 37–54)
EGFRCR SERPLBLD CKD-EPI 2021: 82.9 ML/MIN/1.73
EOSINOPHIL # BLD AUTO: 0.06 10*3/MM3 (ref 0–0.4)
EOSINOPHIL NFR BLD AUTO: 0.9 % (ref 0.3–6.2)
ERYTHROCYTE [DISTWIDTH] IN BLOOD BY AUTOMATED COUNT: 13.3 % (ref 12.3–15.4)
GLOBULIN UR ELPH-MCNC: 2.6 GM/DL
GLUCOSE BLDC GLUCOMTR-MCNC: 96 MG/DL (ref 70–130)
GLUCOSE SERPL-MCNC: 125 MG/DL (ref 65–99)
HCT VFR BLD AUTO: 45.3 % (ref 34–46.6)
HGB BLD-MCNC: 15.3 G/DL (ref 12–15.9)
IMM GRANULOCYTES # BLD AUTO: 0.02 10*3/MM3 (ref 0–0.05)
IMM GRANULOCYTES NFR BLD AUTO: 0.3 % (ref 0–0.5)
LYMPHOCYTES # BLD AUTO: 1.66 10*3/MM3 (ref 0.7–3.1)
LYMPHOCYTES NFR BLD AUTO: 25.7 % (ref 19.6–45.3)
MCH RBC QN AUTO: 31.1 PG (ref 26.6–33)
MCHC RBC AUTO-ENTMCNC: 33.8 G/DL (ref 31.5–35.7)
MCV RBC AUTO: 92.1 FL (ref 79–97)
MONOCYTES # BLD AUTO: 0.51 10*3/MM3 (ref 0.1–0.9)
MONOCYTES NFR BLD AUTO: 7.9 % (ref 5–12)
NEUTROPHILS NFR BLD AUTO: 4.19 10*3/MM3 (ref 1.7–7)
NEUTROPHILS NFR BLD AUTO: 64.9 % (ref 42.7–76)
NRBC BLD AUTO-RTO: 0 /100 WBC (ref 0–0.2)
PLATELET # BLD AUTO: 188 10*3/MM3 (ref 140–450)
PMV BLD AUTO: 10.1 FL (ref 6–12)
POTASSIUM SERPL-SCNC: 3.9 MMOL/L (ref 3.5–5.2)
PROT SERPL-MCNC: 6.5 G/DL (ref 6–8.5)
RBC # BLD AUTO: 4.92 10*6/MM3 (ref 3.77–5.28)
SODIUM SERPL-SCNC: 140 MMOL/L (ref 136–145)
WBC NRBC COR # BLD AUTO: 6.46 10*3/MM3 (ref 3.4–10.8)

## 2024-08-06 PROCEDURE — 80053 COMPREHEN METABOLIC PANEL: CPT | Performed by: PHYSICIAN ASSISTANT

## 2024-08-06 PROCEDURE — 99283 EMERGENCY DEPT VISIT LOW MDM: CPT

## 2024-08-06 PROCEDURE — 93005 ELECTROCARDIOGRAM TRACING: CPT | Performed by: PHYSICIAN ASSISTANT

## 2024-08-06 PROCEDURE — 82948 REAGENT STRIP/BLOOD GLUCOSE: CPT

## 2024-08-06 PROCEDURE — 36415 COLL VENOUS BLD VENIPUNCTURE: CPT

## 2024-08-06 PROCEDURE — 85025 COMPLETE CBC W/AUTO DIFF WBC: CPT | Performed by: PHYSICIAN ASSISTANT

## 2024-08-06 RX ORDER — LORAZEPAM 0.5 MG/1
0.5 TABLET ORAL ONCE
Status: COMPLETED | OUTPATIENT
Start: 2024-08-06 | End: 2024-08-06

## 2024-08-06 RX ADMIN — LORAZEPAM 0.5 MG: 0.5 TABLET ORAL at 01:36

## 2024-08-06 NOTE — DISCHARGE INSTRUCTIONS
ER workup was reassuring.  Blood pressure was elevated with history of hypertension, but patient is anxious and appears in pain.  Patient has stable, superficial laceration with underlying hematoma to the left forearm.  There is no active bleeding.  We applied Dermabond and wound dressing.  Tetanus status is up-to-date.  There is no concern for tendon injury.  Patient has good strength and there is no evidence for active bleeding.  EKG and CBC and chemistries were within normal limits.  Recommend close PCP follow-up for recheck within 24 to 48 hours due to elevated blood pressure for recheck.  Return to the ER if worsening symptoms.

## 2024-08-06 NOTE — ED PROVIDER NOTES
"Subjective   History of Present Illness  This is a 63-year-old female that presents the ER with laceration to the flexor aspect of left forearm that occurred around 1600 this afternoon.  Patient says that she was cutting corn off of the cob, and she was cutting towards herself and accidentally cut her left forearm, extensor aspect.  She said that laceration appeared superficial and there was no significant bleeding.  She cleaned the wound and applied a Band-Aid.  She is right-handed.  Tetanus status is up-to-date.  She thought everything was fine, and she rolled over in bed and then noticed that the wound was bleeding.  She also reported a small localized hematoma.  Patient reports increased pain around the area that is radiating up towards the left upper arm.  She also reports some numbness and tingling to all the fingers on her left hand.  There is no active bleeding upon arrival.  She denies any decreased strength to the left wrist, hand, or the rest of her left upper extremity.  Patient does not utilize any daily aspirin or other chronic anticoagulation.  Past medical history is significant for hypertension, anemia, and hypothyroidism.  No other concerns at this time.  Patient also feels shaky and anxious and blood pressure is elevated.  She denies any chest pain or shortness of breath.  Has been request that we check her blood sugar due to patient being \"shaky\"    History provided by:  Patient and spouse  Laceration  Location:  Shoulder/arm  Shoulder/arm laceration location:  L forearm  Depth:  Through dermis  Time since incident:  8 hours  Laceration mechanism:  Knife  Pain details:     Quality:  Throbbing and aching    Severity:  Mild    Timing:  Constant    Progression:  Unchanged  Foreign body present:  No foreign bodies  Relieved by:  Nothing  Worsened by:  Nothing  Ineffective treatments: Patient cleaned the wound at home with soap and water and place a Band-Aid earlier in the afternoon.  Tetanus status:  " "Up to date  Associated symptoms: swelling (Localized hematoma to flexor aspect of left forearm)    Associated symptoms: no fever, no focal weakness, no numbness (Patient reports some numbness and tingling to fingers on the left hand), no redness and no streaking        Review of Systems   Constitutional: Negative.  Negative for activity change, appetite change, chills, diaphoresis, fatigue and fever.   Respiratory: Negative.  Negative for shortness of breath.    Cardiovascular: Negative.  Negative for chest pain, palpitations and leg swelling.   Gastrointestinal: Negative.  Negative for abdominal pain, constipation, diarrhea, nausea and vomiting.   Musculoskeletal:  Positive for arthralgias (No bony tenderness to left forearm or wrist). Negative for gait problem and joint swelling.   Skin:  Positive for color change (Small hematoma surrounding superficial laceration to the left forearm, no active bleeding) and wound (Superficial laceration to flexor aspect of left forearm).   Neurological:  Positive for numbness (Numbness and tingling to fingers on the left hand). Negative for dizziness, focal weakness, syncope, speech difficulty and headaches.        Patient feels \"shaky \"and anxious upon arrival   Psychiatric/Behavioral:  The patient is nervous/anxious.    All other systems reviewed and are negative.      Past Medical History:   Diagnosis Date    Anemia     Disease of thyroid gland     Hypertension        Allergies   Allergen Reactions    Codeine Itching    Penicillins Swelling       Past Surgical History:   Procedure Laterality Date     SECTION      TUBAL ABDOMINAL LIGATION         No family history on file.    Social History     Socioeconomic History    Marital status:    Tobacco Use    Smoking status: Never   Substance and Sexual Activity    Alcohol use: No    Drug use: No           Objective   Physical Exam  Vitals and nursing note reviewed.   Constitutional:       General: She is not in acute " distress.     Appearance: Normal appearance. She is not ill-appearing, toxic-appearing or diaphoretic.      Comments: Anxious on exam.  No acute distress   HENT:      Head: Normocephalic and atraumatic.      Nose: Nose normal.      Mouth/Throat:      Mouth: Mucous membranes are moist.   Eyes:      Extraocular Movements: Extraocular movements intact.      Conjunctiva/sclera: Conjunctivae normal.      Pupils: Pupils are equal, round, and reactive to light.   Cardiovascular:      Rate and Rhythm: Normal rate and regular rhythm. No extrasystoles are present.     Pulses: Normal pulses.           Radial pulses are 2+ on the left side.      Heart sounds: Normal heart sounds. No murmur heard.     Comments: Regular rate and rhythm.  No ectopy.  No pedal edema to lower extremities.  Strong left radial and ulnar pulses and brisk capillary refill  Pulmonary:      Effort: Pulmonary effort is normal.      Breath sounds: Normal breath sounds.      Comments: Lungs are clear to auscultation bilaterally  Abdominal:      General: Bowel sounds are normal.      Palpations: Abdomen is soft.   Musculoskeletal:         General: Normal range of motion.      Cervical back: Normal range of motion and neck supple.      Right lower leg: No edema.      Left lower leg: No edema.      Comments: No bony tenderness to left wrist or left forearm.  Patient has good strength to the left hand and wrist at 5 out of 5.  No concern for tendon injury.  See skin exam for details on wound to the left flexor aspect of left forearm.   Skin:     General: Skin is warm and dry.      Findings: Bruising and laceration present.             Comments: Patient has 1.5 cm superficial, non-gaping laceration to flexor aspect of left forearm.  There is underlying localized hematoma without any active bleeding.  No erythematous streaking or soft tissue swelling to left forearm.   Neurological:      General: No focal deficit present.      Mental Status: She is alert and  oriented to person, place, and time.      Cranial Nerves: Cranial nerves 2-12 are intact.      Sensory: Sensation is intact.      Motor: Motor function is intact.      Coordination: Coordination is intact.      Comments: Neuro intact and nonfocal.  Good sensation to left hand, and the rest of left upper extremity.   Psychiatric:         Mood and Affect: Affect normal. Mood is anxious.         Speech: Speech normal.         Behavior: Behavior normal. Behavior is cooperative.         Thought Content: Thought content normal.         Cognition and Memory: Cognition and memory normal.         Judgment: Judgment normal.      Comments: Anxious and trembling on exam.         Procedures           ED Course  ED Course as of 08/06/24 0215   Tue Aug 06, 2024   0159 Blood pressure is elevated upon arrival at 193/109.  Patient is anxious and tremulous.  Has been requested that we check her sugar and Accu-Chek was 96.  We gave patient a soda with crackers.  Patient personally evaluated by Dr. Parker, ER attending physician.  There is no need for closure of wound to the flexor aspect of left forearm.  The wound is non-gaping and there is a small underlying hematoma.  Nursing staff cleaned the wound thoroughly and applied Dermabond and nonstick dressing with Coban.  There is no concern for tendon injury.  The patient has good strength 5 out of 5 and full range of motion of the left hand, wrist, and forearm.  Patient's tetanus status is up-to-date.  We performed EKG which showed sinus rhythm without evidence of ectopy, arrhythmia, or ischemia.  I also ordered a dose of Ativan 0.5 mg by mouth and we will do some labs including CBC and chemistries. [FC]   0211 Chemistries were within normal limits.  Patient ready for discharge to home. [FC]      ED Course User Index  [FC] Xi Grider PA-C                                 Recent Results (from the past 24 hour(s))   POC Glucose Once    Collection Time: 08/06/24  1:08 AM     Specimen: Blood   Result Value Ref Range    Glucose 96 70 - 130 mg/dL   ECG 12 Lead Other; weakness    Collection Time: 08/06/24  1:22 AM   Result Value Ref Range    QT Interval 336 ms    QTC Interval 415 ms   Comprehensive Metabolic Panel    Collection Time: 08/06/24  1:37 AM    Specimen: Blood   Result Value Ref Range    Glucose 125 (H) 65 - 99 mg/dL    BUN 8 8 - 23 mg/dL    Creatinine 0.80 0.57 - 1.00 mg/dL    Sodium 140 136 - 145 mmol/L    Potassium 3.9 3.5 - 5.2 mmol/L    Chloride 104 98 - 107 mmol/L    CO2 20.0 (L) 22.0 - 29.0 mmol/L    Calcium 8.6 8.6 - 10.5 mg/dL    Total Protein 6.5 6.0 - 8.5 g/dL    Albumin 3.9 3.5 - 5.2 g/dL    ALT (SGPT) 20 1 - 33 U/L    AST (SGOT) 36 (H) 1 - 32 U/L    Alkaline Phosphatase 53 39 - 117 U/L    Total Bilirubin 0.5 0.0 - 1.2 mg/dL    Globulin 2.6 gm/dL    A/G Ratio 1.5 g/dL    BUN/Creatinine Ratio 10.0 7.0 - 25.0    Anion Gap 16.0 (H) 5.0 - 15.0 mmol/L    eGFR 82.9 >60.0 mL/min/1.73   CBC Auto Differential    Collection Time: 08/06/24  1:37 AM    Specimen: Blood   Result Value Ref Range    WBC 6.46 3.40 - 10.80 10*3/mm3    RBC 4.92 3.77 - 5.28 10*6/mm3    Hemoglobin 15.3 12.0 - 15.9 g/dL    Hematocrit 45.3 34.0 - 46.6 %    MCV 92.1 79.0 - 97.0 fL    MCH 31.1 26.6 - 33.0 pg    MCHC 33.8 31.5 - 35.7 g/dL    RDW 13.3 12.3 - 15.4 %    RDW-SD 45.1 37.0 - 54.0 fl    MPV 10.1 6.0 - 12.0 fL    Platelets 188 140 - 450 10*3/mm3    Neutrophil % 64.9 42.7 - 76.0 %    Lymphocyte % 25.7 19.6 - 45.3 %    Monocyte % 7.9 5.0 - 12.0 %    Eosinophil % 0.9 0.3 - 6.2 %    Basophil % 0.3 0.0 - 1.5 %    Immature Grans % 0.3 0.0 - 0.5 %    Neutrophils, Absolute 4.19 1.70 - 7.00 10*3/mm3    Lymphocytes, Absolute 1.66 0.70 - 3.10 10*3/mm3    Monocytes, Absolute 0.51 0.10 - 0.90 10*3/mm3    Eosinophils, Absolute 0.06 0.00 - 0.40 10*3/mm3    Basophils, Absolute 0.02 0.00 - 0.20 10*3/mm3    Immature Grans, Absolute 0.02 0.00 - 0.05 10*3/mm3    nRBC 0.0 0.0 - 0.2 /100 WBC     Note: In addition to lab  results from this visit, the labs listed above may include labs taken at another facility or during a different encounter within the last 24 hours. Please correlate lab times with ED admission and discharge times for further clarification of the services performed during this visit.    No orders to display     Vitals:    08/06/24 0045 08/06/24 0046 08/06/24 0100 08/06/24 0130   BP: 169/93  (!) 177/141 (!) 166/103   BP Location:       Patient Position:       Pulse:  95 101 93   Resp:       Temp:       TempSrc:       SpO2:  97% 98% 98%   Weight:       Height:         Medications   LORazepam (ATIVAN) tablet 0.5 mg (0.5 mg Oral Given 8/6/24 0136)     ECG/EMG Results (last 24 hours)       Procedure Component Value Units Date/Time    ECG 12 Lead Other; weakness [[id number]] Collected: 08/06/24 0122     Updated: 08/06/24 0124     QT Interval 336 ms      QTC Interval 415 ms     Narrative:      Test Reason : WEAKNESS  Blood Pressure :   */*   mmHG  Vent. Rate :  92 BPM     Atrial Rate :  92 BPM     P-R Int : 162 ms          QRS Dur :  62 ms      QT Int : 336 ms       P-R-T Axes :  77 -17  14 degrees     QTc Int : 415 ms    Sinus rhythm with fusion complexes  Nonspecific ST and T wave abnormality  Abnormal ECG  When compared with ECG of 24-JAN-2018 10:52,  fusion complexes are now present  ST now depressed in Anterior leads  Nonspecific T wave abnormality now evident in Anterolateral leads    Referred By: EDMD           Confirmed By:           ECG 12 Lead Other; weakness   Preliminary Result   Test Reason : WEAKNESS   Blood Pressure :   */*   mmHG   Vent. Rate :  92 BPM     Atrial Rate :  92 BPM      P-R Int : 162 ms          QRS Dur :  62 ms       QT Int : 336 ms       P-R-T Axes :  77 -17  14 degrees      QTc Int : 415 ms      Sinus rhythm with fusion complexes   Nonspecific ST and T wave abnormality   Abnormal ECG   When compared with ECG of 24-JAN-2018 10:52,   fusion complexes are now present   ST now depressed in  Anterior leads   Nonspecific T wave abnormality now evident in Anterolateral leads      Referred By: EDMD           Confirmed By:                       Medical Decision Making  Amount and/or Complexity of Data Reviewed  Labs: ordered.  ECG/medicine tests: ordered.    Risk  Prescription drug management.        Final diagnoses:   Laceration of left forearm, initial encounter   Hematoma of left forearm   Numbness and tingling in left hand   Tremulousness   Anxiety   Elevated blood pressure reading with diagnosis of hypertension   History of hypothyroidism       ED Disposition  ED Disposition       ED Disposition   Discharge    Condition   Stable    Comment   --               Alida Hale PA-C  [address] [place] [id number] [phone]    Schedule an appointment as soon as possible for a visit in 2 days  Schedule close PCP follow-up for recheck within 24 to 48 hours    [facility] EMERGENCY DEPARTMENT  [address] [facility] [address] [phone]    If symptoms worsen         Medication List      No changes were made to your prescriptions during this visit.            Xi Grider PA-C  08/06/24 0215

## 2024-08-06 NOTE — Clinical Note
[facility] EMERGENCY DEPARTMENT  [address]  [facility] [address]  Phone: [phone]    Patrizia Koo was seen and treated in our emergency department on 8/6/2024.  She may return to work on 08/07/2024.         Thank you for choosing [facility].    Xi Grider, JADE

## 2024-08-06 NOTE — Clinical Note
[facility] EMERGENCY DEPARTMENT  [address]  [facility] [address]  Phone: [phone]    Patrizia Koo was seen and treated in our emergency department on 8/6/2024.  She may return to work on 08/07/2024.         Thank you for choosing [facility].    Xi Grider, JADE

## 2024-08-08 LAB
QT INTERVAL: 336 MS
QTC INTERVAL: 415 MS